# Patient Record
Sex: FEMALE | Race: WHITE | NOT HISPANIC OR LATINO | URBAN - METROPOLITAN AREA
[De-identification: names, ages, dates, MRNs, and addresses within clinical notes are randomized per-mention and may not be internally consistent; named-entity substitution may affect disease eponyms.]

---

## 2017-12-26 ENCOUNTER — HISTORICAL (OUTPATIENT)
Dept: RADIOLOGY | Facility: HOSPITAL | Age: 52
End: 2017-12-26

## 2018-01-08 ENCOUNTER — HISTORICAL (OUTPATIENT)
Dept: SURGERY | Facility: HOSPITAL | Age: 53
End: 2018-01-08

## 2018-01-08 LAB
ABS NEUT (OLG): 8.36 X10(3)/MCL (ref 2.1–9.2)
ALBUMIN SERPL-MCNC: 3.6 GM/DL (ref 3.4–5)
ALBUMIN/GLOB SERPL: 1 {RATIO}
ALP SERPL-CCNC: 108 UNIT/L (ref 45–117)
ALT SERPL-CCNC: 19 UNIT/L (ref 13–56)
AST SERPL-CCNC: 26 UNIT/L (ref 15–37)
BILIRUB SERPL-MCNC: 0.4 MG/DL (ref 0.2–1)
BILIRUBIN DIRECT+TOT PNL SERPL-MCNC: 0.1 MG/DL (ref 0–0.2)
BILIRUBIN DIRECT+TOT PNL SERPL-MCNC: 0.3 MG/DL (ref 0–1)
BUN SERPL-MCNC: 11 MG/DL (ref 7–18)
CALCIUM SERPL-MCNC: 9.2 MG/DL (ref 8.5–10.1)
CHLORIDE SERPL-SCNC: 107 MMOL/L (ref 98–107)
CO2 SERPL-SCNC: 26 MMOL/L (ref 21–32)
CREAT SERPL-MCNC: 0.64 MG/DL (ref 0.55–1.02)
ERYTHROCYTE [DISTWIDTH] IN BLOOD BY AUTOMATED COUNT: 12.7 % (ref 11.5–17)
GLOBULIN SER-MCNC: 4 GM/DL (ref 2–4)
GLUCOSE SERPL-MCNC: 97 MG/DL (ref 74–106)
HCT VFR BLD AUTO: 45.6 % (ref 37–47)
HGB BLD-MCNC: 15.3 GM/DL (ref 12–16)
MCH RBC QN AUTO: 30.5 PG (ref 27–31)
MCHC RBC AUTO-ENTMCNC: 33.6 GM/DL (ref 33–36)
MCV RBC AUTO: 90.8 FL (ref 80–94)
PLATELET # BLD AUTO: 261 X10(3)/MCL (ref 130–400)
PMV BLD AUTO: 10.5 FL (ref 7.4–10.4)
POTASSIUM SERPL-SCNC: 3.8 MMOL/L (ref 3.5–5.1)
PROT SERPL-MCNC: 7.1 GM/DL (ref 6.4–8.2)
RBC # BLD AUTO: 5.02 X10(6)/MCL (ref 4.2–5.4)
SODIUM SERPL-SCNC: 142 MMOL/L (ref 136–145)
WBC # SPEC AUTO: 11.4 X10(3)/MCL (ref 4.5–11.5)

## 2020-09-22 ENCOUNTER — HISTORICAL (OUTPATIENT)
Dept: RADIOLOGY | Facility: HOSPITAL | Age: 55
End: 2020-09-22

## 2022-04-30 NOTE — OP NOTE
Patient:   Kaity Barber            MRN: [id number]            FIN: [id number]               Age:   52 years     Sex:  Female     :  1965   Associated Diagnoses:   None   Author:   Jaqui Cooney MD      Operative Note   Operative Information   Date/ Time:  2018 13:23:00.     Procedures Performed: Colonoscopy with biopsy.     Preoperative Diagnosis: Abdominal pain, altered bowel movement, weight loss..     Postoperative Diagnosis: 1.  Scattered diverticula, left colon.  2.  Hyperplastic-appearing polyps, rectum ×2.  Biopsies were done.  3.  Internal hemorrhoids, grade 2-3.  Preparation was fair..     Surgeon: Jqaui Cooney MD.     Assistant: Whitney KULKARNI, Sudhir Lezama M.     Anesthesia: Per anesthesia service.     Speciman Removed: Yes.     Esimated blood loss: loss  3  cc.     Description of Procedure/Findings/    Complications: History and physical as per preoperative note.  Informed consent was obtained.  Patient was placed in left lateral position.  Sedation per anesthesia service.  Rectal exam was unremarkable.  Olympus video colonoscope was introduced into the rectum and was carefully advanced to cecum.  Quality of the prep was fair after thorough irrigation.     Findings: There were scattered diverticula noted in left colon.  Photo documentation was obtained.  There were 2 hyperplastic-appearing polyps ranging in size from 2-3 mm noted in rectum and rectosigmoid region.  Biopsies were done.  There were grade 2-3 internal hemorrhoids noted on withdrawal of the scope.  Cecum, ascending colon and transverse colon appeared unremarkable to the extent of visualization.  Estimated withdrawal time from cecum to rectum was 10 minutes and 35 seconds.  Quality of the prep was fair after thorough irrigation..     Complications: None.     Recommendations:  1.  High-fiber diet.   2.  Follow biopsy results.   3.  Patient to follow with me as scheduled.    c.c.: ANICETO Ruth.

## 2022-04-30 NOTE — OP NOTE
Patient:   Kaity Barber            MRN: [id number]            FIN: [id number]               Age:   52 years     Sex:  Female     :  1965   Associated Diagnoses:   None   Author:   Jaqui Cooney MD      Operative Note   Operative Information   Date/ Time:  2018 12:34:00.     Procedures Performed: EGD with biopsy.     Preoperative Diagnosis: Anorexia, weight loss, bloating..     Postoperative Diagnosis: 1.  Hiatal hernia, approximately 2 cm.  2.  Antral gastritis, mild to moderate.  CLOtest done.  3.  Duodenitis, mild (Duodenal bulb..     Surgeon: aJqui Cooney MD.     Assistant: Whitney KULKARNI, Sudhir Lezama M.     Anesthesia: Per anesthesia service.     Speciman Removed: Yes.     Esimated blood loss: loss  3  cc.     Description of Procedure/Findings/    Complications: History and physical as per preoperative note.  Informed consent was obtained.  Patient was placed in left lateral position.  Sedation anesthesia service.  Olympus video gastroscope was introduced into the oral cavity and esophagus was intubated under direct visualization.  Scope was carefully advanced to distal duodenum.  Patient tolerated procedure well without any complications.     Findings: Esophagus: Normal mucosa.  GE junction at approximately 40 cm.  There was a 2 cm hiatal hernia.  Stomach: Fundus, cardia and body appeared unremarkable.  There was mild to moderate erythema noted in antrum.  CLOtest was done.  Duodenum: There was mild erythema noted in duodenal bulb.  2nd and 3rd portion of the duodenum appeared unremarkable to the extent of exam..     Complications: None.     Recommendations:  1.  Follow CLOtest results.  2.  H2 blockers or PPI as needed.  3.  Colonoscopy to follow    c.c.: ANICETO Farias.

## 2023-10-02 ENCOUNTER — HOSPITAL ENCOUNTER (OUTPATIENT)
Dept: RADIOLOGY | Facility: HOSPITAL | Age: 58
Discharge: HOME | End: 2023-10-02
Attending: NURSE PRACTITIONER
Payer: COMMERCIAL

## 2023-10-02 DIAGNOSIS — Z12.31 BREAST CANCER SCREENING BY MAMMOGRAM: ICD-10-CM

## 2023-10-02 PROCEDURE — 77063 MAMMO DIGITAL SCREENING BILAT WITH TOMO: ICD-10-PCS | Mod: 26,,, | Performed by: RADIOLOGY

## 2023-10-02 PROCEDURE — 77067 SCR MAMMO BI INCL CAD: CPT | Mod: TC

## 2023-10-02 PROCEDURE — 77067 SCR MAMMO BI INCL CAD: CPT | Mod: 26,,, | Performed by: RADIOLOGY

## 2023-10-02 PROCEDURE — 77067 MAMMO DIGITAL SCREENING BILAT WITH TOMO: ICD-10-PCS | Mod: 26,,, | Performed by: RADIOLOGY

## 2023-10-02 PROCEDURE — 77063 BREAST TOMOSYNTHESIS BI: CPT | Mod: 26,,, | Performed by: RADIOLOGY

## 2024-10-24 ENCOUNTER — HOSPITAL ENCOUNTER (OUTPATIENT)
Dept: RADIOLOGY | Facility: HOSPITAL | Age: 59
Discharge: HOME | End: 2024-10-24
Attending: FAMILY MEDICINE
Payer: COMMERCIAL

## 2024-10-24 DIAGNOSIS — Z12.31 ENCOUNTER FOR SCREENING MAMMOGRAM FOR BREAST CANCER: ICD-10-CM

## 2024-10-24 PROCEDURE — 77067 SCR MAMMO BI INCL CAD: CPT | Mod: 26,,, | Performed by: RADIOLOGY

## 2024-10-24 PROCEDURE — 77063 BREAST TOMOSYNTHESIS BI: CPT | Mod: 26,,, | Performed by: RADIOLOGY

## 2024-10-24 PROCEDURE — 77063 BREAST TOMOSYNTHESIS BI: CPT | Mod: TC

## 2024-10-24 PROCEDURE — 77067 SCR MAMMO BI INCL CAD: CPT | Mod: TC
